# Patient Record
Sex: FEMALE | Race: BLACK OR AFRICAN AMERICAN | Employment: UNEMPLOYED | URBAN - METROPOLITAN AREA
[De-identification: names, ages, dates, MRNs, and addresses within clinical notes are randomized per-mention and may not be internally consistent; named-entity substitution may affect disease eponyms.]

---

## 2024-07-09 ENCOUNTER — PREP FOR PROCEDURE (OUTPATIENT)
Dept: OBGYN | Age: 34
End: 2024-07-09

## 2024-07-09 ENCOUNTER — HOSPITAL ENCOUNTER (OUTPATIENT)
Age: 34
Setting detail: SPECIMEN
Discharge: HOME | End: 2024-07-09

## 2024-07-09 ENCOUNTER — INITIAL PRENATAL (OUTPATIENT)
Dept: OBGYN | Age: 34
End: 2024-07-09

## 2024-07-09 VITALS — DIASTOLIC BLOOD PRESSURE: 64 MMHG | WEIGHT: 159 LBS | SYSTOLIC BLOOD PRESSURE: 101 MMHG

## 2024-07-09 DIAGNOSIS — Z98.891 S/P REPEAT LOW TRANSVERSE C-SECTION: ICD-10-CM

## 2024-07-09 DIAGNOSIS — Z3A.37 37 WEEKS GESTATION OF PREGNANCY: Primary | ICD-10-CM

## 2024-07-09 DIAGNOSIS — Z60.3 LANGUAGE BARRIER: ICD-10-CM

## 2024-07-09 DIAGNOSIS — Z12.4 CERVICAL CANCER SCREENING: ICD-10-CM

## 2024-07-09 DIAGNOSIS — O99.019 ANEMIA DURING PREGNANCY: ICD-10-CM

## 2024-07-09 DIAGNOSIS — B37.9 YEAST INFECTION: ICD-10-CM

## 2024-07-09 DIAGNOSIS — Z11.3 SCREENING EXAMINATION FOR STD (SEXUALLY TRANSMITTED DISEASE): ICD-10-CM

## 2024-07-09 DIAGNOSIS — Z34.83 PRENATAL CARE, SUBSEQUENT PREGNANCY, THIRD TRIMESTER: ICD-10-CM

## 2024-07-09 DIAGNOSIS — Z75.8 LANGUAGE BARRIER: ICD-10-CM

## 2024-07-09 DIAGNOSIS — O09.33 LATE PRENATAL CARE AFFECTING PREGNANCY IN THIRD TRIMESTER: ICD-10-CM

## 2024-07-09 PROCEDURE — 99214 OFFICE O/P EST MOD 30 MIN: CPT | Performed by: STUDENT IN AN ORGANIZED HEALTH CARE EDUCATION/TRAINING PROGRAM

## 2024-07-09 PROCEDURE — 36415 COLL VENOUS BLD VENIPUNCTURE: CPT | Performed by: STUDENT IN AN ORGANIZED HEALTH CARE EDUCATION/TRAINING PROGRAM

## 2024-07-09 PROCEDURE — 88142 CYTOPATH C/V THIN LAYER: CPT

## 2024-07-09 PROCEDURE — 87624 HPV HI-RISK TYP POOLED RSLT: CPT

## 2024-07-09 PROCEDURE — 87801 DETECT AGNT MULT DNA AMPLI: CPT

## 2024-07-09 RX ORDER — FERROUS SULFATE 325(65) MG
325 TABLET, DELAYED RELEASE (ENTERIC COATED) ORAL
COMMUNITY

## 2024-07-09 RX ORDER — CLOTRIMAZOLE 1 %
CREAM WITH APPLICATOR VAGINAL
Qty: 1 EACH | Refills: 0 | Status: SHIPPED | OUTPATIENT
Start: 2024-07-09

## 2024-07-09 SDOH — SOCIAL STABILITY - SOCIAL INSECURITY: ACCULTURATION DIFFICULTY: Z60.3

## 2024-07-09 NOTE — PROGRESS NOTES
Department of Obstetrics and Gynecology  Initial Prenatal Office Visit  Name:  Sabrina Pepe   CSN: [id number]    : 1990   Age: 33 y.o.     Chief Complaint   Patient presents with    Initial Prenatal Visit     New Ob from [place] had ultrasound there. Prenatal labs, 1 hr gtt and gbs today. Taking iron for anemia. Unsure if she is taking pnv. Pt also c/o burning on tongue like an open sore. Also states when she brushes teeth her tongue bleeds. +fm        Subjective:    Sabrina Pepe is a 33 y.o. female,  ,  LMP was Patient's last menstrual period was 10/23/2023., who presents for presents for prenatal care. She is certain about the date of her LMP. Her last pap smear was unknown. Reports her tongue bleeds when she brushes it.     Past History:  G1: PLTCS, male infant, for [facility] in [place]  G2: Current    Risk factors:  Late prenatal care  Hx Csx1([facility] in [place])  Anemia(iron)    She will be 33 years old or older at the time of her delivery.    Since her last menstrual period she denies alcohol use, tobacco use, marijuana use and illicit drug use.    The patient does not work. She denies X ray or radiation exposure. She denies exposure to industrial solvents. Her work does not take place in an environment of excessively high temperatures and does not expose her to loud ambient noise. She does not care for infants, toddlers or incontinent adults.     Patient denies personal history and family history of birth defects, genetic defects, bleeding disorders, mental retardation or neural tube defects.     Patient reports there are not issues with domestic violence and she feels safe in her home.    Past Medical History:   Diagnosis Date    Anemia        Past Surgical History:   Procedure Laterality Date     SECTION         Social History     Socioeconomic History    Marital status: Single     Spouse name: Not on file    Number of children: Not on file    Years of education: Not on file

## 2024-07-10 LAB
ABO + RH BLD: NORMAL
BASOPHILS # BLD: 0 K/UL (ref 0–0.2)
BASOPHILS NFR BLD: 0.8 %
BLD GP AB SCN SERPL QL: NORMAL
DEPRECATED RDW RBC AUTO: 14.5 % (ref 12.4–15.4)
EOSINOPHIL # BLD: 0.1 K/UL (ref 0–0.6)
EOSINOPHIL NFR BLD: 0.9 %
GLUCOSE 1H P 50 G GLC PO SERPL-MCNC: 72 MG/DL
HBV SURFACE AG SERPL QL IA: ABNORMAL
HCT VFR BLD AUTO: 33.1 % (ref 36–48)
HGB BLD-MCNC: 10.6 G/DL (ref 12–16)
HIV 1+2 AB+HIV1 P24 AG SERPL QL IA: NORMAL
HIV 2 AB SERPL QL IA: NORMAL
HIV1 AB SERPL QL IA: NORMAL
HIV1 P24 AG SERPL QL IA: NORMAL
LYMPHOCYTES # BLD: 1.5 K/UL (ref 1–5.1)
LYMPHOCYTES NFR BLD: 23.8 %
MCH RBC QN AUTO: 27.9 PG (ref 26–34)
MCHC RBC AUTO-ENTMCNC: 32.1 G/DL (ref 31–36)
MCV RBC AUTO: 86.8 FL (ref 80–100)
MONOCYTES # BLD: 0.9 K/UL (ref 0–1.3)
MONOCYTES NFR BLD: 14.6 %
NEUTROPHILS # BLD: 3.7 K/UL (ref 1.7–7.7)
NEUTROPHILS NFR BLD: 59.9 %
PLATELET # BLD AUTO: 313 K/UL (ref 135–450)
PMV BLD AUTO: 7.2 FL (ref 5–10.5)
RBC # BLD AUTO: 3.82 M/UL (ref 4–5.2)
REAGIN+T PALLIDUM IGG+IGM SERPL-IMP: ABNORMAL
RUBV IGG SERPL IA-ACNC: >500 IU/ML
WBC # BLD AUTO: 6.2 K/UL (ref 4–11)

## 2024-07-11 LAB
HCV RNA SERPL NAA+PROBE-ACNC: NOT DETECTED IU/ML
HCV RNA SERPL NAA+PROBE-LOG IU: NOT DETECTED LOG IU/ML
HCV RNA SERPL QL NAA+PROBE: NOT DETECTED

## 2024-07-12 LAB
C TRACH RRNA SPEC QL NAA+PROBE: NEGATIVE
GP B STREP SPEC QL CULT: ABNORMAL
N GONORRHOEA RRNA SPEC QL NAA+PROBE: NEGATIVE
ORGANISM: ABNORMAL

## 2024-07-13 LAB — HPV HIGH RISK: NOT DETECTED

## 2024-07-15 ENCOUNTER — ROUTINE PRENATAL (OUTPATIENT)
Dept: OBGYN | Age: 34
End: 2024-07-15

## 2024-07-15 VITALS — DIASTOLIC BLOOD PRESSURE: 62 MMHG | SYSTOLIC BLOOD PRESSURE: 99 MMHG | WEIGHT: 158 LBS

## 2024-07-15 DIAGNOSIS — Z3A.38 38 WEEKS GESTATION OF PREGNANCY: Primary | ICD-10-CM

## 2024-07-15 DIAGNOSIS — Z34.83 PRENATAL CARE, SUBSEQUENT PREGNANCY, THIRD TRIMESTER: ICD-10-CM

## 2024-07-15 NOTE — PROGRESS NOTES
Return OB Office Visit    CC:   Chief Complaint   Patient presents with    Routine Prenatal Visit     Repeat caesarean, preop today, consent signed. No c/o. +fm.        HPI:  Patient seen and examined. No concerns/complaints. Denies VB, LOF, ctx. +Fm.  Denies headaches, vision changes, RUQ pain, increased LE edema.  Denies chest pain, shortness of breath, fever, chills, nausea, vomiting.      Review of Systems: The following ROS was otherwise negative, except as noted in the HPI: constitutional, HEENT, respiratory, cardiovascular, gastrointestinal, genitourinary, skin, musculoskeletal, neurological, psych    Objective:  BP 99/62   Wt 71.7 kg (158 lb)   LMP 10/23/2023     Physical Exam    Gravid, non tender, no flank pain    FHR: + by doppler    Assessment/Plan:   Sabrina Pepe is a 33 y.o.  at 38w0d who presents for routine OB visit     Diagnosis Orders   1. 38 weeks gestation of pregnancy        2. Prenatal care, subsequent pregnancy, third trimester            Data Unavailable     Chaperone Nereida Buck was present for the entire appointment.      The patient will monitor for loss of fluid or vaginal bleeding.  If age-appropriate she will monitor for fetal movement.  If she is having more than 4-6 contractions an hour she will present to labor and delivery.  She will continue taking her prenatal vitamins as prescribed.  All up-to-date prenatal labs and imaging were reviewed and discussed with patient.    No follow-ups on file.    Luis Santa MD

## 2024-07-22 ENCOUNTER — ANESTHESIA (OUTPATIENT)
Dept: LABOR AND DELIVERY | Age: 34
End: 2024-07-22
Payer: MEDICAID

## 2024-07-22 ENCOUNTER — ANESTHESIA EVENT (OUTPATIENT)
Dept: LABOR AND DELIVERY | Age: 34
End: 2024-07-22
Payer: MEDICAID

## 2024-07-22 ENCOUNTER — HOSPITAL ENCOUNTER (INPATIENT)
Age: 34
LOS: 3 days | Discharge: HOME | End: 2024-07-25
Attending: STUDENT IN AN ORGANIZED HEALTH CARE EDUCATION/TRAINING PROGRAM | Admitting: STUDENT IN AN ORGANIZED HEALTH CARE EDUCATION/TRAINING PROGRAM
Payer: MEDICAID

## 2024-07-22 DIAGNOSIS — O34.219 PREVIOUS CESAREAN SECTION COMPLICATING PREGNANCY: Primary | ICD-10-CM

## 2024-07-22 PROBLEM — O99.013 ANEMIA AFFECTING PREGNANCY IN THIRD TRIMESTER: Status: ACTIVE | Noted: 2024-07-22

## 2024-07-22 PROBLEM — O09.33 LATE PRENATAL CARE AFFECTING PREGNANCY IN THIRD TRIMESTER: Status: ACTIVE | Noted: 2024-07-22

## 2024-07-22 PROBLEM — O99.820 GROUP B STREPTOCOCCAL CARRIAGE COMPLICATING PREGNANCY: Status: ACTIVE | Noted: 2024-07-22

## 2024-07-22 LAB
ABO/RH: NORMAL
AMPHETAMINES: NEGATIVE
ANTIBODY SCREEN: NEGATIVE
BACTERIA: NEGATIVE /HPF
BARBITURATE SCREEN URINE: NEGATIVE
BENZODIAZEPINE SCREEN, URINE: NEGATIVE
BILIRUBIN, URINE: ABNORMAL MG/DL
BLOOD, URINE: ABNORMAL
CANNABINOID SCREEN URINE: NEGATIVE
CLARITY, UA: CLEAR
COCAINE METABOLITE: NEGATIVE
COLOR, UA: YELLOW
FENTANYL URINE: NEGATIVE
GLUCOSE URINE: NEGATIVE MG/DL
HCT VFR BLD CALC: 32.7 % (ref 37–47)
HEMOGLOBIN: 10.6 GM/DL (ref 12.5–16)
KETONES, URINE: NEGATIVE MG/DL
LEUKOCYTE ESTERASE, URINE: ABNORMAL
MCH RBC QN AUTO: 27.7 PG (ref 27–31)
MCHC RBC AUTO-ENTMCNC: 32.4 % (ref 32–36)
MCV RBC AUTO: 85.4 FL (ref 78–100)
MUCUS: ABNORMAL HPF
NITRITE URINE, QUANTITATIVE: NEGATIVE
OPIATES, URINE: NEGATIVE
OXYCODONE: NEGATIVE
PDW BLD-RTO: 13.8 % (ref 11.7–14.9)
PH, URINE: 7 (ref 5–8)
PLATELET # BLD: 320 K/CU MM (ref 140–440)
PMV BLD AUTO: 8.8 FL (ref 7.5–11.1)
PROTEIN UA: 30 MG/DL
RBC # BLD: 3.83 M/CU MM (ref 4.2–5.4)
RBC URINE: 8 /HPF (ref 0–6)
SPECIFIC GRAVITY UA: 1.02 (ref 1–1.03)
SQUAMOUS EPITHELIAL: 28 /HPF
TRICHOMONAS: ABNORMAL /HPF
UROBILINOGEN, URINE: 1 MG/DL (ref 0.2–1)
WBC # BLD: 5.4 K/CU MM (ref 4–10.5)
WBC UA: 50 /HPF (ref 0–5)

## 2024-07-22 PROCEDURE — 7100000000 HC PACU RECOVERY - FIRST 15 MIN: Performed by: STUDENT IN AN ORGANIZED HEALTH CARE EDUCATION/TRAINING PROGRAM

## 2024-07-22 PROCEDURE — 6360000002 HC RX W HCPCS: Performed by: STUDENT IN AN ORGANIZED HEALTH CARE EDUCATION/TRAINING PROGRAM

## 2024-07-22 PROCEDURE — 6370000000 HC RX 637 (ALT 250 FOR IP): Performed by: STUDENT IN AN ORGANIZED HEALTH CARE EDUCATION/TRAINING PROGRAM

## 2024-07-22 PROCEDURE — 80307 DRUG TEST PRSMV CHEM ANLYZR: CPT

## 2024-07-22 PROCEDURE — 2580000003 HC RX 258: Performed by: STUDENT IN AN ORGANIZED HEALTH CARE EDUCATION/TRAINING PROGRAM

## 2024-07-22 PROCEDURE — 86780 TREPONEMA PALLIDUM: CPT

## 2024-07-22 PROCEDURE — 86850 RBC ANTIBODY SCREEN: CPT

## 2024-07-22 PROCEDURE — 86900 BLOOD TYPING SEROLOGIC ABO: CPT

## 2024-07-22 PROCEDURE — 1220000000 HC SEMI PRIVATE OB R&B

## 2024-07-22 PROCEDURE — 7100000001 HC PACU RECOVERY - ADDTL 15 MIN: Performed by: STUDENT IN AN ORGANIZED HEALTH CARE EDUCATION/TRAINING PROGRAM

## 2024-07-22 PROCEDURE — 3700000001 HC ADD 15 MINUTES (ANESTHESIA): Performed by: STUDENT IN AN ORGANIZED HEALTH CARE EDUCATION/TRAINING PROGRAM

## 2024-07-22 PROCEDURE — 3609079900 HC CESAREAN SECTION: Performed by: STUDENT IN AN ORGANIZED HEALTH CARE EDUCATION/TRAINING PROGRAM

## 2024-07-22 PROCEDURE — 6370000000 HC RX 637 (ALT 250 FOR IP)

## 2024-07-22 PROCEDURE — 6360000002 HC RX W HCPCS: Performed by: NURSE ANESTHETIST, CERTIFIED REGISTERED

## 2024-07-22 PROCEDURE — 85027 COMPLETE CBC AUTOMATED: CPT

## 2024-07-22 PROCEDURE — 3700000000 HC ANESTHESIA ATTENDED CARE: Performed by: STUDENT IN AN ORGANIZED HEALTH CARE EDUCATION/TRAINING PROGRAM

## 2024-07-22 PROCEDURE — 2709999900 HC NON-CHARGEABLE SUPPLY: Performed by: STUDENT IN AN ORGANIZED HEALTH CARE EDUCATION/TRAINING PROGRAM

## 2024-07-22 PROCEDURE — 2500000003 HC RX 250 WO HCPCS: Performed by: STUDENT IN AN ORGANIZED HEALTH CARE EDUCATION/TRAINING PROGRAM

## 2024-07-22 PROCEDURE — 86901 BLOOD TYPING SEROLOGIC RH(D): CPT

## 2024-07-22 PROCEDURE — 81001 URINALYSIS AUTO W/SCOPE: CPT

## 2024-07-22 PROCEDURE — 59514 CESAREAN DELIVERY ONLY: CPT | Performed by: STUDENT IN AN ORGANIZED HEALTH CARE EDUCATION/TRAINING PROGRAM

## 2024-07-22 RX ORDER — ONDANSETRON 2 MG/ML
4 INJECTION INTRAMUSCULAR; INTRAVENOUS EVERY 6 HOURS PRN
Status: DISCONTINUED | OUTPATIENT
Start: 2024-07-22 | End: 2024-07-25 | Stop reason: HOSPADM

## 2024-07-22 RX ORDER — MORPHINE SULFATE 0.5 MG/ML
INJECTION, SOLUTION EPIDURAL; INTRATHECAL; INTRAVENOUS PRN
Status: DISCONTINUED | OUTPATIENT
Start: 2024-07-22 | End: 2024-07-22 | Stop reason: SDUPTHER

## 2024-07-22 RX ORDER — FAMOTIDINE 20 MG/1
20 TABLET, FILM COATED ORAL 2 TIMES DAILY
Status: DISCONTINUED | OUTPATIENT
Start: 2024-07-22 | End: 2024-07-25 | Stop reason: HOSPADM

## 2024-07-22 RX ORDER — KETOROLAC TROMETHAMINE 30 MG/ML
30 INJECTION, SOLUTION INTRAMUSCULAR; INTRAVENOUS EVERY 6 HOURS
Status: DISCONTINUED | OUTPATIENT
Start: 2024-07-22 | End: 2024-07-23

## 2024-07-22 RX ORDER — SODIUM CHLORIDE, SODIUM LACTATE, POTASSIUM CHLORIDE, AND CALCIUM CHLORIDE .6; .31; .03; .02 G/100ML; G/100ML; G/100ML; G/100ML
1000 INJECTION, SOLUTION INTRAVENOUS ONCE
Status: COMPLETED | OUTPATIENT
Start: 2024-07-22 | End: 2024-07-22

## 2024-07-22 RX ORDER — ONDANSETRON 4 MG/1
4 TABLET, ORALLY DISINTEGRATING ORAL EVERY 8 HOURS PRN
Status: DISCONTINUED | OUTPATIENT
Start: 2024-07-22 | End: 2024-07-25 | Stop reason: HOSPADM

## 2024-07-22 RX ORDER — CITRIC ACID/SODIUM CITRATE 334-500MG
30 SOLUTION, ORAL ORAL ONCE
Status: COMPLETED | OUTPATIENT
Start: 2024-07-22 | End: 2024-07-22

## 2024-07-22 RX ORDER — BUPIVACAINE HYDROCHLORIDE 7.5 MG/ML
INJECTION, SOLUTION INTRASPINAL PRN
Status: DISCONTINUED | OUTPATIENT
Start: 2024-07-22 | End: 2024-07-22 | Stop reason: SDUPTHER

## 2024-07-22 RX ORDER — DEXAMETHASONE SODIUM PHOSPHATE 4 MG/ML
INJECTION, SOLUTION INTRA-ARTICULAR; INTRALESIONAL; INTRAMUSCULAR; INTRAVENOUS; SOFT TISSUE PRN
Status: DISCONTINUED | OUTPATIENT
Start: 2024-07-22 | End: 2024-07-22 | Stop reason: SDUPTHER

## 2024-07-22 RX ORDER — ONDANSETRON 2 MG/ML
INJECTION INTRAMUSCULAR; INTRAVENOUS PRN
Status: DISCONTINUED | OUTPATIENT
Start: 2024-07-22 | End: 2024-07-22 | Stop reason: SDUPTHER

## 2024-07-22 RX ORDER — SODIUM CHLORIDE 0.9 % (FLUSH) 0.9 %
5-40 SYRINGE (ML) INJECTION PRN
Status: DISCONTINUED | OUTPATIENT
Start: 2024-07-22 | End: 2024-07-22 | Stop reason: HOSPADM

## 2024-07-22 RX ORDER — DIPHENHYDRAMINE HCL 25 MG
25 TABLET ORAL EVERY 6 HOURS PRN
Status: DISCONTINUED | OUTPATIENT
Start: 2024-07-22 | End: 2024-07-25 | Stop reason: HOSPADM

## 2024-07-22 RX ORDER — DOCUSATE SODIUM 100 MG/1
100 CAPSULE, LIQUID FILLED ORAL 2 TIMES DAILY
Status: DISCONTINUED | OUTPATIENT
Start: 2024-07-22 | End: 2024-07-25 | Stop reason: HOSPADM

## 2024-07-22 RX ORDER — SODIUM CHLORIDE 0.9 % (FLUSH) 0.9 %
5-40 SYRINGE (ML) INJECTION EVERY 12 HOURS SCHEDULED
Status: DISCONTINUED | OUTPATIENT
Start: 2024-07-22 | End: 2024-07-22 | Stop reason: HOSPADM

## 2024-07-22 RX ORDER — SODIUM CHLORIDE 9 MG/ML
INJECTION, SOLUTION INTRAVENOUS PRN
Status: DISCONTINUED | OUTPATIENT
Start: 2024-07-22 | End: 2024-07-22 | Stop reason: HOSPADM

## 2024-07-22 RX ORDER — FENTANYL CITRATE 50 UG/ML
INJECTION, SOLUTION INTRAMUSCULAR; INTRAVENOUS PRN
Status: DISCONTINUED | OUTPATIENT
Start: 2024-07-22 | End: 2024-07-22 | Stop reason: SDUPTHER

## 2024-07-22 RX ORDER — SODIUM CHLORIDE 0.9 % (FLUSH) 0.9 %
10 SYRINGE (ML) INJECTION PRN
Status: DISCONTINUED | OUTPATIENT
Start: 2024-07-22 | End: 2024-07-22

## 2024-07-22 RX ORDER — ONDANSETRON 2 MG/ML
4 INJECTION INTRAMUSCULAR; INTRAVENOUS
Status: DISCONTINUED | OUTPATIENT
Start: 2024-07-22 | End: 2024-07-22 | Stop reason: HOSPADM

## 2024-07-22 RX ORDER — ACETAMINOPHEN 325 MG/1
975 TABLET ORAL ONCE
Status: COMPLETED | OUTPATIENT
Start: 2024-07-22 | End: 2024-07-22

## 2024-07-22 RX ORDER — SODIUM CHLORIDE 0.9 % (FLUSH) 0.9 %
5-40 SYRINGE (ML) INJECTION PRN
Status: DISCONTINUED | OUTPATIENT
Start: 2024-07-22 | End: 2024-07-25 | Stop reason: HOSPADM

## 2024-07-22 RX ORDER — ONDANSETRON 2 MG/ML
4 INJECTION INTRAMUSCULAR; INTRAVENOUS EVERY 6 HOURS PRN
Status: DISCONTINUED | OUTPATIENT
Start: 2024-07-22 | End: 2024-07-22

## 2024-07-22 RX ORDER — SODIUM CHLORIDE 0.9 % (FLUSH) 0.9 %
10 SYRINGE (ML) INJECTION EVERY 12 HOURS SCHEDULED
Status: DISCONTINUED | OUTPATIENT
Start: 2024-07-22 | End: 2024-07-22

## 2024-07-22 RX ORDER — NALOXONE HYDROCHLORIDE 0.4 MG/ML
INJECTION, SOLUTION INTRAMUSCULAR; INTRAVENOUS; SUBCUTANEOUS PRN
Status: DISCONTINUED | OUTPATIENT
Start: 2024-07-22 | End: 2024-07-22 | Stop reason: HOSPADM

## 2024-07-22 RX ORDER — ENOXAPARIN SODIUM 100 MG/ML
40 INJECTION SUBCUTANEOUS DAILY
Status: DISCONTINUED | OUTPATIENT
Start: 2024-07-23 | End: 2024-07-25 | Stop reason: HOSPADM

## 2024-07-22 RX ORDER — IBUPROFEN 800 MG/1
800 TABLET ORAL EVERY 8 HOURS
Status: DISCONTINUED | OUTPATIENT
Start: 2024-07-23 | End: 2024-07-23

## 2024-07-22 RX ORDER — SODIUM CHLORIDE 9 MG/ML
INJECTION, SOLUTION INTRAVENOUS PRN
Status: DISCONTINUED | OUTPATIENT
Start: 2024-07-22 | End: 2024-07-22

## 2024-07-22 RX ORDER — OXYCODONE HYDROCHLORIDE 5 MG/1
5 TABLET ORAL EVERY 4 HOURS PRN
Status: DISCONTINUED | OUTPATIENT
Start: 2024-07-22 | End: 2024-07-25 | Stop reason: HOSPADM

## 2024-07-22 RX ORDER — OXYCODONE HYDROCHLORIDE 5 MG/1
10 TABLET ORAL EVERY 4 HOURS PRN
Status: DISCONTINUED | OUTPATIENT
Start: 2024-07-22 | End: 2024-07-25 | Stop reason: HOSPADM

## 2024-07-22 RX ORDER — DIPHENHYDRAMINE HYDROCHLORIDE 50 MG/ML
25 INJECTION INTRAMUSCULAR; INTRAVENOUS EVERY 6 HOURS PRN
Status: DISCONTINUED | OUTPATIENT
Start: 2024-07-22 | End: 2024-07-25 | Stop reason: HOSPADM

## 2024-07-22 RX ORDER — SODIUM CHLORIDE 9 MG/ML
INJECTION, SOLUTION INTRAVENOUS PRN
Status: DISCONTINUED | OUTPATIENT
Start: 2024-07-22 | End: 2024-07-25 | Stop reason: HOSPADM

## 2024-07-22 RX ORDER — SODIUM CHLORIDE 0.9 % (FLUSH) 0.9 %
5-40 SYRINGE (ML) INJECTION EVERY 12 HOURS SCHEDULED
Status: DISCONTINUED | OUTPATIENT
Start: 2024-07-22 | End: 2024-07-25 | Stop reason: HOSPADM

## 2024-07-22 RX ORDER — SODIUM CHLORIDE, SODIUM LACTATE, POTASSIUM CHLORIDE, CALCIUM CHLORIDE 600; 310; 30; 20 MG/100ML; MG/100ML; MG/100ML; MG/100ML
INJECTION, SOLUTION INTRAVENOUS CONTINUOUS
Status: DISCONTINUED | OUTPATIENT
Start: 2024-07-22 | End: 2024-07-22

## 2024-07-22 RX ORDER — LANOLIN 72 %
OINTMENT (GRAM) TOPICAL
Status: DISCONTINUED | OUTPATIENT
Start: 2024-07-22 | End: 2024-07-25 | Stop reason: HOSPADM

## 2024-07-22 RX ORDER — FAMOTIDINE 10 MG/ML
20 INJECTION, SOLUTION INTRAVENOUS ONCE
Status: COMPLETED | OUTPATIENT
Start: 2024-07-22 | End: 2024-07-22

## 2024-07-22 RX ORDER — ACETAMINOPHEN 500 MG
1000 TABLET ORAL EVERY 8 HOURS SCHEDULED
Status: DISCONTINUED | OUTPATIENT
Start: 2024-07-22 | End: 2024-07-25 | Stop reason: HOSPADM

## 2024-07-22 RX ADMIN — DEXAMETHASONE SODIUM PHOSPHATE 4 MG: 4 INJECTION, SOLUTION INTRAMUSCULAR; INTRAVENOUS at 15:21

## 2024-07-22 RX ADMIN — BUPIVACAINE HYDROCHLORIDE IN DEXTROSE 1.6 ML: 7.5 INJECTION, SOLUTION SUBARACHNOID at 14:25

## 2024-07-22 RX ADMIN — FENTANYL CITRATE 10 MCG: 50 INJECTION, SOLUTION INTRAMUSCULAR; INTRAVENOUS at 14:25

## 2024-07-22 RX ADMIN — ONDANSETRON 4 MG: 2 INJECTION INTRAMUSCULAR; INTRAVENOUS at 15:21

## 2024-07-22 RX ADMIN — ONDANSETRON 4 MG: 2 INJECTION INTRAMUSCULAR; INTRAVENOUS at 13:23

## 2024-07-22 RX ADMIN — WATER 2000 MG: 1 INJECTION INTRAMUSCULAR; INTRAVENOUS; SUBCUTANEOUS at 14:13

## 2024-07-22 RX ADMIN — SODIUM CITRATE AND CITRIC ACID MONOHYDRATE 30 ML: 500; 334 SOLUTION ORAL at 13:23

## 2024-07-22 RX ADMIN — ONDANSETRON 4 MG: 2 INJECTION INTRAMUSCULAR; INTRAVENOUS at 20:59

## 2024-07-22 RX ADMIN — SODIUM CHLORIDE, POTASSIUM CHLORIDE, SODIUM LACTATE AND CALCIUM CHLORIDE: 600; 310; 30; 20 INJECTION, SOLUTION INTRAVENOUS at 13:34

## 2024-07-22 RX ADMIN — ACETAMINOPHEN 975 MG: 325 TABLET ORAL at 13:24

## 2024-07-22 RX ADMIN — CEFAZOLIN 2000 MG: 2 INJECTION, POWDER, FOR SOLUTION INTRAMUSCULAR; INTRAVENOUS at 13:34

## 2024-07-22 RX ADMIN — Medication 87.3 MILLI-UNITS/MIN: at 15:16

## 2024-07-22 RX ADMIN — MORPHINE SULFATE 0.2 MG: 0.5 INJECTION, SOLUTION EPIDURAL; INTRATHECAL; INTRAVENOUS at 14:25

## 2024-07-22 RX ADMIN — KETOROLAC TROMETHAMINE 30 MG: 30 INJECTION, SOLUTION INTRAMUSCULAR; INTRAVENOUS at 20:59

## 2024-07-22 RX ADMIN — SODIUM CHLORIDE, POTASSIUM CHLORIDE, SODIUM LACTATE AND CALCIUM CHLORIDE 1000 ML: 600; 310; 30; 20 INJECTION, SOLUTION INTRAVENOUS at 12:45

## 2024-07-22 RX ADMIN — FAMOTIDINE 20 MG: 10 INJECTION, SOLUTION INTRAVENOUS at 13:23

## 2024-07-22 RX ADMIN — SODIUM CHLORIDE, POTASSIUM CHLORIDE, SODIUM LACTATE AND CALCIUM CHLORIDE: 600; 310; 30; 20 INJECTION, SOLUTION INTRAVENOUS at 15:07

## 2024-07-22 RX ADMIN — SODIUM CHLORIDE, PRESERVATIVE FREE 10 ML: 5 INJECTION INTRAVENOUS at 20:59

## 2024-07-22 ASSESSMENT — PAIN SCALES - GENERAL: PAINLEVEL_OUTOF10: 1

## 2024-07-22 ASSESSMENT — PAIN - FUNCTIONAL ASSESSMENT: PAIN_FUNCTIONAL_ASSESSMENT: ACTIVITIES ARE NOT PREVENTED

## 2024-07-22 ASSESSMENT — PAIN DESCRIPTION - LOCATION: LOCATION: ABDOMEN;INCISION

## 2024-07-22 ASSESSMENT — PAIN DESCRIPTION - ORIENTATION: ORIENTATION: LOWER

## 2024-07-22 ASSESSMENT — PAIN DESCRIPTION - DESCRIPTORS: DESCRIPTORS: ACHING;CRAMPING;DISCOMFORT

## 2024-07-22 NOTE — ANESTHESIA PRE PROCEDURE
Department of Anesthesiology  Preprocedure Note       Name:  Sabrina Pepe   Age:  33 y.o.  :  1990                                          MRN:  [id number]         Date:  2024      Surgeon: Surgeon(s):  Anna Higgins DO    Procedure: Procedure(s):   SECTION    Medications prior to admission:   Prior to Admission medications    Medication Sig Start Date End Date Taking? Authorizing Provider   ferrous sulfate (FE TABS 325) 325 (65 Fe) MG EC tablet Take 1 tablet by mouth 3 times daily (with meals)    Provider, MD Shahram   clotrimazole (LOTRIMIN) 1 % vaginal cream Place 1 applicator nightly at bedtime for 7 days. 24   Anna Higgins DO       Current medications:    Current Facility-Administered Medications   Medication Dose Route Frequency Provider Last Rate Last Admin   • lactated ringers IV soln infusion   IntraVENous Continuous Anna Higgins DO       • lactated ringers bolus 1,000 mL  1,000 mL IntraVENous Once Anna Higgins DO       • sodium chloride flush 0.9 % injection 10 mL  10 mL IntraVENous 2 times per day Anna Higgins DO       • sodium chloride flush 0.9 % injection 10 mL  10 mL IntraVENous PRN Anna Higgins DO       • 0.9 % sodium chloride infusion   IntraVENous PRN Anna Higgins DO       • citric acid-sodium citrate (BICITRA) solution 30 mL  30 mL Oral Once Anna Higgins DO       • famotidine (PEPCID) injection 20 mg  20 mg IntraVENous Once Anna Higgins DO       • acetaminophen (TYLENOL) tablet 975 mg  975 mg Oral Once Anna Higgins DO       • oxytocin (PITOCIN) 30 units in 500 mL infusion  87.3 clif-units/min IntraVENous Continuous PRN Anna Higgins DO        And   • oxytocin (PITOCIN) 10 unit bolus from the bag  10 Units IntraVENous PRN Anna Higgins DO       • ondansetron (ZOFRAN) injection 4 mg  4 mg IntraVENous Q6H PRN Anna Higgins DO       • ceFAZolin (ANCEF) 2,000 mg in sterile water 20 mL IV syringe  2,000 mg IntraVENous Once Anna Higgins DO

## 2024-07-22 NOTE — ANESTHESIA POSTPROCEDURE EVALUATION
Department of Anesthesiology  Postprocedure Note    Patient: Sabrina Pepe  MRN: [id number]  YOB: 1990  Date of evaluation: 2024    Procedure Summary       Date: 24 Room / Location: [address]    Anesthesia Start: 1420 Anesthesia Stop:     Procedure:  SECTION (Abdomen) Diagnosis:       S/P repeat low transverse       (S/P repeat low transverse  [Z98.891])    Surgeons: Anna Higgins DO Responsible Provider: Elder Leonard MD    Anesthesia Type: spinal ASA Status: 2            Anesthesia Type: No value filed.    Ubaldo Phase I:      Ubaldo Phase II:      Anesthesia Post Evaluation    Patient location during evaluation: PACU  Patient participation: complete - patient participated  Level of consciousness: awake  Pain score: 0  Airway patency: patent  Nausea & Vomiting: no nausea and no vomiting  Cardiovascular status: blood pressure returned to baseline  Respiratory status: acceptable  Hydration status: euvolemic  Multimodal analgesia pain management approach  Pain management: adequate    There were no known notable events for this encounter.

## 2024-07-22 NOTE — CONSULTS
Session ID: [id number]  Language: Omani Creole   ID: #[id number]   Name: Radha    Recovery start time 1603. Informed patient of  Plan of care. Baby remains in SCN for assessment. Patient voiced understanding. Patient denies pain or needs.

## 2024-07-22 NOTE — ANESTHESIA PROCEDURE NOTES
Spinal Block    End time: 7/22/2024 2:25 PM  Reason for block: primary anesthetic  Staffing  Performed: resident/CRNA   Resident/CRNA: Raymond Wilkerson APRN - CRNA  Performed by: Raymond Wilkerson APRN - CRNA  Authorized by: Elder Leonard MD    Spinal Block  Patient position: sitting  Prep: ChloraPrep  Patient monitoring: cardiac monitor, continuous pulse ox and continuous capnometry  Approach: midline  Location: L3/L4  Provider prep: mask and sterile gloves  Local infiltration: lidocaine  Needle  Needle type: Pencan   Needle gauge: 24 G  Needle length: 4 in  Assessment  Sensory level: T4  Swirl obtained: Yes  CSF: clear  Attempts: 1  Hemodynamics: stable  Preanesthetic Checklist  Completed: patient identified, IV checked, site marked, risks and benefits discussed, surgical/procedural consents, equipment checked, pre-op evaluation, timeout performed, anesthesia consent given, oxygen available, monitors applied/VS acknowledged, fire risk safety assessment completed and verbalized and blood product R/B/A discussed and consented

## 2024-07-22 NOTE — OP NOTE
Department of Obstetrics and Gynecology  Labor and Delivery  Operative Report  Name:  Sabrina Pepe   CSN: [id number]   Attending Provider: Anna Higgins DO  Location:  [facility]   : 1990   Age: 33 y.o.     Section Operative Report    Date of surgery:   2024      Surgeon:   Anna Higgins DO    First Assistant:   Marianne Syed RN    Pre-op Diagnosis:   1. 39w0d IUP  2. Previous CS x1  3. GBS positive  4. Anemia in pregnancy  5. Late prenatal care    Post-op Diagnosis:   1. 39w0d IUP  2. Previous CS x1  3. GBS positive  4. Anemia in pregnancy  5. Late prenatal care  6. Bladder peritoneal adhesions  7. Live female infant 7lb 2oz APGARs pending      Procedure:   RLTCS    Complications:   None    Anesthesia:   Spinal    QBL:  755 cc    Specimen:  Cord blood    Indication:   The patient is a 33 y.o.  @ 39w0d presented for scheduled RLTCS.    Findings: Uterus, tubes and ovaries consistent with normal gravid state. Tubes and ovaries freely movable. Bladder peritoneal adhesion present to anterior surface of uterus. Fascia also thickened with scar tissue.      Procedure in Detail: After discussing the risks and benefits with the patient including but not limited to damage to bowel, bladder, blood vessels, and the ureters, as well as DVT/PE, the patient was consented and taken to the operating room.  Pre-operative antibiotics were given, spinal anesthesia was placed, and a crowe catheter was inserted which was draining clear urine.  The patient was then prepped and draped in the normal sterile manner in the supine position with a leftward tilt.  Anesthesia was then noted to be adequate. A pfannensteil skin incision was created sharply two finger breadths above the pubic symphysis and incision was carried down to the underlying layer of fascia with the bovie.  The fascia was incised in the midline and the incision was extended laterally with the Yoo scissors.  The superior aspect of the

## 2024-07-22 NOTE — PROGRESS NOTES
Patient arrived ambulatory to Labor & Delivery for scheduled . Patient shown to room and instructed to place on gown and obtain urine specimen. Patient oriented to room and call light.    EFM & toco applied, +FHR. Abdomen soft and non tender to palpation. Patient denies headache, epigastric pain, abdominal pain and contractions. Patient reports feeling her baby move well. Denies vaginal bleeding or leaking of fluid.     Discussed safe sleep and infant/patient safety and fall prevention information. Patient voiced understanding and forms completed. Fingerprints obtained.     Plan of care for  discussed with patient in length. Discussed patient's birth plan for her labor, as well as pharmacological and non pharmacological interventions for pain relief after surgery.  Patient voiced understanding.

## 2024-07-22 NOTE — PROGRESS NOTES
1517 Blayne called to delivery for assistance.     1518 Dr. Cee arrived to bedside to assist with  resuscitation.

## 2024-07-22 NOTE — H&P
MANY (A) NEGATIVE HPF    Trichomonas NONE SEEN NONE SEEN /HPF       ASSESSMENT AND PLAN:     The patient is a 33 y.o.  at 39w0d weeks with scheduled RLTCS  Present on Admission:   Previous  section complicating pregnancy   Late prenatal care affecting pregnancy in third trimester   Group B streptococcal carriage complicating pregnancy   Anemia affecting pregnancy in third trimester   Labor and delivery indication for care or intervention    Admission labs  Ancef 2g for surgical prophylaxis     Patient was counseled on the risks, benefits, alternatives and indications of  delivery. Risks include, but are not limited to infection, bleeding possibly requiring transfusion (with associated risks of HIV, hepatitis, and other blood-borne illnesses) or hysterectomy, injury to surrounding organs, vasculature, and nerves, injury to fetus, post-operative thromboembolic events (including DVT and PE), risk of anesthesia, and risk of death.  Risk for future pregnancy and delivery concerns including risk of trial of labor after  section and repeat  section reviewed.  Patient verbalized understanding of these risks. All questions were answered to her satisfaction. Pt desires to proceed with  delivery.    Electronically signed: Anna Higgins DO 2024

## 2024-07-23 LAB
HCT VFR BLD CALC: 30.7 % (ref 37–47)
HEMOGLOBIN: 9.7 GM/DL (ref 12.5–16)
MCH RBC QN AUTO: 27.6 PG (ref 27–31)
MCHC RBC AUTO-ENTMCNC: 31.6 % (ref 32–36)
MCV RBC AUTO: 87.2 FL (ref 78–100)
PDW BLD-RTO: 13.9 % (ref 11.7–14.9)
PLATELET # BLD: 279 K/CU MM (ref 140–440)
PMV BLD AUTO: 8.9 FL (ref 7.5–11.1)
RBC # BLD: 3.52 M/CU MM (ref 4.2–5.4)
WBC # BLD: 12.6 K/CU MM (ref 4–10.5)

## 2024-07-23 PROCEDURE — 1220000000 HC SEMI PRIVATE OB R&B

## 2024-07-23 PROCEDURE — 36415 COLL VENOUS BLD VENIPUNCTURE: CPT

## 2024-07-23 PROCEDURE — 6370000000 HC RX 637 (ALT 250 FOR IP): Performed by: STUDENT IN AN ORGANIZED HEALTH CARE EDUCATION/TRAINING PROGRAM

## 2024-07-23 PROCEDURE — 99211 OFF/OP EST MAY X REQ PHY/QHP: CPT

## 2024-07-23 PROCEDURE — 2580000003 HC RX 258: Performed by: STUDENT IN AN ORGANIZED HEALTH CARE EDUCATION/TRAINING PROGRAM

## 2024-07-23 PROCEDURE — 85027 COMPLETE CBC AUTOMATED: CPT

## 2024-07-23 PROCEDURE — 6360000002 HC RX W HCPCS: Performed by: STUDENT IN AN ORGANIZED HEALTH CARE EDUCATION/TRAINING PROGRAM

## 2024-07-23 RX ORDER — IBUPROFEN 800 MG/1
800 TABLET ORAL EVERY 8 HOURS
Status: DISCONTINUED | OUTPATIENT
Start: 2024-07-23 | End: 2024-07-25 | Stop reason: HOSPADM

## 2024-07-23 RX ADMIN — IBUPROFEN 800 MG: 800 TABLET, FILM COATED ORAL at 19:11

## 2024-07-23 RX ADMIN — DOCUSATE SODIUM 100 MG: 100 CAPSULE, LIQUID FILLED ORAL at 08:38

## 2024-07-23 RX ADMIN — IBUPROFEN 800 MG: 800 TABLET, FILM COATED ORAL at 05:30

## 2024-07-23 RX ADMIN — ENOXAPARIN SODIUM 40 MG: 100 INJECTION SUBCUTANEOUS at 08:38

## 2024-07-23 RX ADMIN — FAMOTIDINE 20 MG: 20 TABLET ORAL at 21:03

## 2024-07-23 RX ADMIN — ACETAMINOPHEN 1000 MG: 500 TABLET ORAL at 08:36

## 2024-07-23 RX ADMIN — FAMOTIDINE 20 MG: 20 TABLET ORAL at 08:38

## 2024-07-23 RX ADMIN — ACETAMINOPHEN 1000 MG: 500 TABLET ORAL at 22:44

## 2024-07-23 RX ADMIN — OXYCODONE HYDROCHLORIDE 10 MG: 5 TABLET ORAL at 15:18

## 2024-07-23 RX ADMIN — ACETAMINOPHEN 1000 MG: 500 TABLET ORAL at 15:17

## 2024-07-23 RX ADMIN — DOCUSATE SODIUM 100 MG: 100 CAPSULE, LIQUID FILLED ORAL at 21:03

## 2024-07-23 RX ADMIN — DIPHENHYDRAMINE HYDROCHLORIDE 25 MG: 25 TABLET ORAL at 21:03

## 2024-07-23 RX ADMIN — ACETAMINOPHEN 1000 MG: 500 TABLET ORAL at 00:02

## 2024-07-23 ASSESSMENT — PAIN DESCRIPTION - DESCRIPTORS
DESCRIPTORS: ACHING;CRAMPING;DISCOMFORT
DESCRIPTORS: ACHING;CRAMPING;DISCOMFORT
DESCRIPTORS: ACHING
DESCRIPTORS: ACHING
DESCRIPTORS: ACHING;CRAMPING;DISCOMFORT
DESCRIPTORS: CRAMPING;DISCOMFORT

## 2024-07-23 ASSESSMENT — PAIN - FUNCTIONAL ASSESSMENT
PAIN_FUNCTIONAL_ASSESSMENT: ACTIVITIES ARE NOT PREVENTED

## 2024-07-23 ASSESSMENT — PAIN DESCRIPTION - ONSET
ONSET: ON-GOING
ONSET: GRADUAL

## 2024-07-23 ASSESSMENT — PAIN SCALES - GENERAL
PAINLEVEL_OUTOF10: 5
PAINLEVEL_OUTOF10: 0
PAINLEVEL_OUTOF10: 5
PAINLEVEL_OUTOF10: 1
PAINLEVEL_OUTOF10: 0
PAINLEVEL_OUTOF10: 8
PAINLEVEL_OUTOF10: 0

## 2024-07-23 ASSESSMENT — PAIN DESCRIPTION - LOCATION
LOCATION: ABDOMEN;INCISION
LOCATION: ABDOMEN;INCISION
LOCATION: ABDOMEN
LOCATION: ABDOMEN

## 2024-07-23 ASSESSMENT — PAIN DESCRIPTION - ORIENTATION
ORIENTATION: LOWER

## 2024-07-23 ASSESSMENT — PAIN DESCRIPTION - FREQUENCY
FREQUENCY: CONTINUOUS
FREQUENCY: INTERMITTENT

## 2024-07-23 ASSESSMENT — PAIN DESCRIPTION - PAIN TYPE: TYPE: ACUTE PAIN

## 2024-07-23 NOTE — PLAN OF CARE
Problem: Pain  Goal: Verbalizes/displays adequate comfort level or baseline comfort level  Outcome: Progressing  Flowsheets (Taken 7/23/2024 0808)  Verbalizes/displays adequate comfort level or baseline comfort level:   Encourage patient to monitor pain and request assistance   Administer analgesics based on type and severity of pain and evaluate response   Consider cultural and social influences on pain and pain management   Assess pain using appropriate pain scale   Implement non-pharmacological measures as appropriate and evaluate response     Problem: Infection - Adult  Goal: Absence of infection at discharge  Outcome: Progressing  Goal: Absence of infection during hospitalization  Outcome: Progressing  Goal: Absence of fever/infection during anticipated neutropenic period  Outcome: Progressing     Problem: Safety - Adult  Goal: Free from fall injury  Outcome: Progressing     Problem: Discharge Planning  Goal: Discharge to home or other facility with appropriate resources  Outcome: Progressing

## 2024-07-23 NOTE — PLAN OF CARE
Problem: Pain  Goal: Verbalizes/displays adequate comfort level or baseline comfort level  7/23/2024 1324 by Fadumo Salas RN  Outcome: Progressing  7/23/2024 1159 by Fadumo Salas RN  Outcome: Progressing  Flowsheets (Taken 7/23/2024 0808)  Verbalizes/displays adequate comfort level or baseline comfort level:   Encourage patient to monitor pain and request assistance   Administer analgesics based on type and severity of pain and evaluate response   Consider cultural and social influences on pain and pain management   Assess pain using appropriate pain scale   Implement non-pharmacological measures as appropriate and evaluate response     Problem: Infection - Adult  Goal: Absence of infection at discharge  7/23/2024 1324 by Fadumo Salas RN  Outcome: Progressing  7/23/2024 1159 by Fadumo Salas RN  Outcome: Progressing  Goal: Absence of infection during hospitalization  7/23/2024 1324 by Fadumo Salas RN  Outcome: Progressing  7/23/2024 1159 by Fadumo Salas RN  Outcome: Progressing  Goal: Absence of fever/infection during anticipated neutropenic period  7/23/2024 1324 by Fadumo Salas RN  Outcome: Progressing  7/23/2024 1159 by Fadumo Salas RN  Outcome: Progressing     Problem: Safety - Adult  Goal: Free from fall injury  7/23/2024 1324 by Fadumo Salas RN  Outcome: Progressing  7/23/2024 1159 by Fadumo Salas RN  Outcome: Progressing     Problem: Discharge Planning  Goal: Discharge to home or other facility with appropriate resources  7/23/2024 1324 by Fadumo Salas RN  Outcome: Progressing  7/23/2024 1159 by Fadumo Salas, RN  Outcome: Progressing

## 2024-07-23 NOTE — PROGRESS NOTES
Subjective:     Postpartum Day 1:   The patient feels well. The patient denies emotional concerns. Pain is well controlled with current medications. The baby iswell.   The patient is ambulating well. The patient is tolerating a normal diet.    Objective:        Vitals:    07/23/24 0423   BP: (!) 92/52   Pulse: 68   Resp: 16   Temp: 97.7 °F (36.5 °C)   SpO2: 99%       Lab Results   Component Value Date    WBC 12.6 (H) 07/23/2024    HGB 9.7 (L) 07/23/2024    HCT 30.7 (L) 07/23/2024    MCV 87.2 07/23/2024     07/23/2024       General:    alert, appears stated age, and cooperative       Lochia:  appropriate   Uterine    firm       DVT Evaluation:  No evidence of DVT seen on physical exam.     Assessment:     Postpartum day 1 Doing well post delivery.     Plan:     Continue current care.    Luis Santa MD 7/23/2024 8:40 AM

## 2024-07-23 NOTE — LACTATION NOTE
Language line used.    ID: #[id number]   Name: Miroslava               Infant is in SCN. Mother states she would like to use electric breast pump. Hospital electric breast pump to mother and set up and showed her how to use pump. Encouraged mother to pump every 2 hours, even during the night, and to pump for 10-15 minutes. Mother verbalizes understanding. Discussed with mother that if she expresses any breast milk we can store it in the breast milk only refrigerator. Mother verbalizes understanding.

## 2024-07-23 NOTE — PLAN OF CARE
Problem: Pain  Goal: Verbalizes/displays adequate comfort level or baseline comfort level  7/22/2024 2141 by Dinae Lozada RN  Outcome: Progressing  Flowsheets (Taken 7/22/2024 2059)  Verbalizes/displays adequate comfort level or baseline comfort level:   Encourage patient to monitor pain and request assistance   Assess pain using appropriate pain scale   Administer analgesics based on type and severity of pain and evaluate response   Consider cultural and social influences on pain and pain management   Implement non-pharmacological measures as appropriate and evaluate response   Notify Licensed Independent Practitioner if interventions unsuccessful or patient reports new pain  7/22/2024 1355 by Yana Spears, RN  Outcome: Progressing     Problem: Infection - Adult  Goal: Absence of infection at discharge  7/22/2024 2141 by Diane Lozada RN  Outcome: Progressing  7/22/2024 1355 by Yana Spears RN  Outcome: Progressing  Goal: Absence of infection during hospitalization  7/22/2024 2141 by Diane Lozada RN  Outcome: Progressing  7/22/2024 1355 by Yana Spears RN  Outcome: Progressing  Goal: Absence of fever/infection during anticipated neutropenic period  7/22/2024 2141 by Diane Lozada RN  Outcome: Progressing  7/22/2024 1355 by Yana Spears RN  Outcome: Progressing     Problem: Safety - Adult  Goal: Free from fall injury  7/22/2024 2141 by Diane Lozada RN  Outcome: Progressing  7/22/2024 1355 by Yana Spears RN  Outcome: Progressing     Problem: Discharge Planning  Goal: Discharge to home or other facility with appropriate resources  7/22/2024 2141 by Diane Lozada RN  Outcome: Progressing  7/22/2024 1355 by Yana Spears RN  Outcome: Progressing

## 2024-07-24 LAB — T PALLIDUM IGG SER QL IF: NON REACTIVE

## 2024-07-24 PROCEDURE — 6360000002 HC RX W HCPCS: Performed by: STUDENT IN AN ORGANIZED HEALTH CARE EDUCATION/TRAINING PROGRAM

## 2024-07-24 PROCEDURE — 6370000000 HC RX 637 (ALT 250 FOR IP)

## 2024-07-24 PROCEDURE — 6370000000 HC RX 637 (ALT 250 FOR IP): Performed by: STUDENT IN AN ORGANIZED HEALTH CARE EDUCATION/TRAINING PROGRAM

## 2024-07-24 PROCEDURE — 99024 POSTOP FOLLOW-UP VISIT: CPT

## 2024-07-24 PROCEDURE — 1220000000 HC SEMI PRIVATE OB R&B

## 2024-07-24 RX ORDER — FERROUS SULFATE 325(65) MG
325 TABLET ORAL 2 TIMES DAILY WITH MEALS
Status: DISCONTINUED | OUTPATIENT
Start: 2024-07-24 | End: 2024-07-25 | Stop reason: HOSPADM

## 2024-07-24 RX ADMIN — DOCUSATE SODIUM 100 MG: 100 CAPSULE, LIQUID FILLED ORAL at 21:14

## 2024-07-24 RX ADMIN — FAMOTIDINE 20 MG: 20 TABLET ORAL at 21:14

## 2024-07-24 RX ADMIN — FAMOTIDINE 20 MG: 20 TABLET ORAL at 10:00

## 2024-07-24 RX ADMIN — DOCUSATE SODIUM 100 MG: 100 CAPSULE, LIQUID FILLED ORAL at 09:59

## 2024-07-24 RX ADMIN — FERROUS SULFATE TAB 325 MG (65 MG ELEMENTAL FE) 325 MG: 325 (65 FE) TAB at 09:59

## 2024-07-24 RX ADMIN — OXYCODONE HYDROCHLORIDE 10 MG: 5 TABLET ORAL at 04:48

## 2024-07-24 RX ADMIN — ACETAMINOPHEN 1000 MG: 500 TABLET ORAL at 10:00

## 2024-07-24 RX ADMIN — IBUPROFEN 800 MG: 800 TABLET, FILM COATED ORAL at 12:54

## 2024-07-24 RX ADMIN — FERROUS SULFATE TAB 325 MG (65 MG ELEMENTAL FE) 325 MG: 325 (65 FE) TAB at 17:37

## 2024-07-24 RX ADMIN — IBUPROFEN 800 MG: 800 TABLET, FILM COATED ORAL at 21:13

## 2024-07-24 RX ADMIN — ACETAMINOPHEN 1000 MG: 500 TABLET ORAL at 17:37

## 2024-07-24 RX ADMIN — ENOXAPARIN SODIUM 40 MG: 100 INJECTION SUBCUTANEOUS at 10:00

## 2024-07-24 RX ADMIN — IBUPROFEN 800 MG: 800 TABLET, FILM COATED ORAL at 04:34

## 2024-07-24 ASSESSMENT — PAIN DESCRIPTION - ORIENTATION
ORIENTATION: LOWER
ORIENTATION: MID
ORIENTATION: LOWER
ORIENTATION: LOWER;MID

## 2024-07-24 ASSESSMENT — PAIN SCALES - GENERAL
PAINLEVEL_OUTOF10: 4
PAINLEVEL_OUTOF10: 3
PAINLEVEL_OUTOF10: 7
PAINLEVEL_OUTOF10: 0
PAINLEVEL_OUTOF10: 6

## 2024-07-24 ASSESSMENT — PAIN - FUNCTIONAL ASSESSMENT
PAIN_FUNCTIONAL_ASSESSMENT: ACTIVITIES ARE NOT PREVENTED
PAIN_FUNCTIONAL_ASSESSMENT: ACTIVITIES ARE NOT PREVENTED

## 2024-07-24 ASSESSMENT — PAIN DESCRIPTION - DESCRIPTORS
DESCRIPTORS: ACHING
DESCRIPTORS: ACHING;CRAMPING;DISCOMFORT
DESCRIPTORS: DISCOMFORT
DESCRIPTORS: ACHING;CRAMPING;DISCOMFORT

## 2024-07-24 ASSESSMENT — PAIN DESCRIPTION - LOCATION
LOCATION: ABDOMEN
LOCATION: ABDOMEN
LOCATION: ABDOMEN;INCISION
LOCATION: ABDOMEN;INCISION

## 2024-07-24 NOTE — LACTATION NOTE
Language line used.  #[id number]. Infant now able to be in room with mother. Noted mother has breast feed as well as formula feeding. Encouraged mother to breast feed for feedings to keep breast stimulated for milk production. Mother verbalizes understanding. Mother denies difficulty with breast feeding. Encouraged mother to call for any breast feeding assistance. Reminded mother to breast feed at least every 3 hours or more often with feeding cues. Reminded mother to offer both breast with each feeding and to breast at least 10 minutes on each side, mother states she does know this.

## 2024-07-24 NOTE — PROGRESS NOTES
Department of Obstetrics and Gynecology  Labor and Delivery   Post Partum Progress Note      SUBJECTIVE:  Doing well with no complaints. Ambulating throughout room without dizziness or other s/s of anemia. Reports bleeding is decreasing and pain is well controlled with medication. Denies pain or discharge at incisional site. Has voided without difficulty. Has not had BM, + flatus. Eating and drinking well. Denies HA/visual changes/epigastric pain. Breastfeeding is going well. Reports good social support. Denies emotional concerns at this time.     OBJECTIVE:      Vitals:  /78   Pulse 88   Temp 97.8 °F (36.6 °C) (Oral)   Resp 16   LMP 10/23/2023   SpO2 99%   Breastfeeding Unknown   Lab Results   Component Value Date    WBC 12.6 (H) 07/23/2024    HGB 9.7 (L) 07/23/2024    HCT 30.7 (L) 07/23/2024    MCV 87.2 07/23/2024     07/23/2024       CONSTITUTIONAL: generally well-appearing.   ABDOMEN:  Soft, well contracted uterus. Fundus firm at u-1. C/s incision c/d/i. No erythema or discharge noted. BS present x 4 quadrants.   LOCHIA: Normal per pt  LUNGS: CTAB  HEART: RRR,   EXTREMITIES: No calf tenderness, erythema or swelling bilaterally       ASSESSMENT:      PPD # 2  S/p C/s  Breastfeeding well  GBS Positive  RH AB+  Anemia- on ferrous sulfate    PLAN:     Will Continue with PP POC   Encourage PO hydration and early ambulation   Up to shower with Instruction on incision care.   Will plan for discharge on PPD3.      Time Spent 15      LULÚ Bolanos - YUMIKOM

## 2024-07-24 NOTE — PLAN OF CARE
Problem: Pain  Goal: Verbalizes/displays adequate comfort level or baseline comfort level  7/24/2024 1102 by Nida Lomas RN  Outcome: Progressing  7/23/2024 2207 by Diane Lozada RN  Outcome: Progressing  Flowsheets (Taken 7/23/2024 2046)  Verbalizes/displays adequate comfort level or baseline comfort level:   Encourage patient to monitor pain and request assistance   Assess pain using appropriate pain scale   Administer analgesics based on type and severity of pain and evaluate response   Implement non-pharmacological measures as appropriate and evaluate response   Consider cultural and social influences on pain and pain management   Notify Licensed Independent Practitioner if interventions unsuccessful or patient reports new pain     Problem: Infection - Adult  Goal: Absence of infection at discharge  7/24/2024 1102 by Nida Lomas RN  Outcome: Progressing  7/23/2024 2207 by Diane Lozada RN  Outcome: Progressing  Goal: Absence of infection during hospitalization  7/24/2024 1102 by Nida Lomas RN  Outcome: Progressing  7/23/2024 2207 by Diane Lozada RN  Outcome: Progressing  Goal: Absence of fever/infection during anticipated neutropenic period  7/24/2024 1102 by Nida Lomas RN  Outcome: Progressing  7/23/2024 2207 by Diane Lozada RN  Outcome: Progressing     Problem: Safety - Adult  Goal: Free from fall injury  7/24/2024 1102 by Nida Lomas RN  Outcome: Progressing  7/23/2024 2207 by Diane Lozada RN  Outcome: Progressing     Problem: Discharge Planning  Goal: Discharge to home or other facility with appropriate resources  7/24/2024 1102 by Nida Lomas RN  Outcome: Progressing  7/23/2024 2207 by Diane Lozada RN  Outcome: Progressing     Problem: Alteration in the Breast  Goal: Optimize infant feeding at the breast  Description: INTERVENTIONS:  1. Breast and nipple assessment  2. Assess prior breast feeding history  3. Hand expression of breast milk  4.

## 2024-07-24 NOTE — CONSULTS
Session Code:[id number]  American Creole #[id number]   name Jeff ADKNIS discussed plan of care with patient

## 2024-07-24 NOTE — PLAN OF CARE
Problem: Pain  Goal: Verbalizes/displays adequate comfort level or baseline comfort level  7/23/2024 2207 by Diane Lozada RN  Outcome: Progressing  Flowsheets (Taken 7/23/2024 2046)  Verbalizes/displays adequate comfort level or baseline comfort level:   Encourage patient to monitor pain and request assistance   Assess pain using appropriate pain scale   Administer analgesics based on type and severity of pain and evaluate response   Implement non-pharmacological measures as appropriate and evaluate response   Consider cultural and social influences on pain and pain management   Notify Licensed Independent Practitioner if interventions unsuccessful or patient reports new pain  7/23/2024 1324 by Fadumo Salas RN  Outcome: Progressing  7/23/2024 1159 by Fadumo Salas RN  Outcome: Progressing  Flowsheets (Taken 7/23/2024 0808)  Verbalizes/displays adequate comfort level or baseline comfort level:   Encourage patient to monitor pain and request assistance   Administer analgesics based on type and severity of pain and evaluate response   Consider cultural and social influences on pain and pain management   Assess pain using appropriate pain scale   Implement non-pharmacological measures as appropriate and evaluate response     Problem: Infection - Adult  Goal: Absence of infection at discharge  7/23/2024 2207 by Diane Lozada RN  Outcome: Progressing  7/23/2024 1324 by Fadumo Salas RN  Outcome: Progressing  7/23/2024 1159 by Fadumo Salas RN  Outcome: Progressing  Goal: Absence of infection during hospitalization  7/23/2024 2207 by Diane Lozada RN  Outcome: Progressing  7/23/2024 1324 by Fadumo Salas RN  Outcome: Progressing  7/23/2024 1159 by Fadumo Salas, RN  Outcome: Progressing  Goal: Absence of fever/infection during anticipated neutropenic period  7/23/2024 2207 by Diane Lozada RN  Outcome: Progressing  7/23/2024 1324 by Fadumo Salas RN  Outcome: Progressing  7/23/2024 1159 by

## 2024-07-25 VITALS
SYSTOLIC BLOOD PRESSURE: 105 MMHG | OXYGEN SATURATION: 100 % | TEMPERATURE: 97.6 F | DIASTOLIC BLOOD PRESSURE: 73 MMHG | RESPIRATION RATE: 16 BRPM | HEART RATE: 82 BPM

## 2024-07-25 PROCEDURE — APPNB30 APP NON BILLABLE TIME 0-30 MINS

## 2024-07-25 PROCEDURE — 6370000000 HC RX 637 (ALT 250 FOR IP): Performed by: STUDENT IN AN ORGANIZED HEALTH CARE EDUCATION/TRAINING PROGRAM

## 2024-07-25 PROCEDURE — 6370000000 HC RX 637 (ALT 250 FOR IP)

## 2024-07-25 PROCEDURE — 6360000002 HC RX W HCPCS: Performed by: STUDENT IN AN ORGANIZED HEALTH CARE EDUCATION/TRAINING PROGRAM

## 2024-07-25 RX ORDER — PSEUDOEPHEDRINE HCL 30 MG
100 TABLET ORAL 2 TIMES DAILY
Qty: 60 CAPSULE | Refills: 0 | Status: SHIPPED | OUTPATIENT
Start: 2024-07-25

## 2024-07-25 RX ORDER — OXYCODONE HYDROCHLORIDE 5 MG/1
5 TABLET ORAL EVERY 6 HOURS PRN
Qty: 20 TABLET | Refills: 0 | Status: SHIPPED | OUTPATIENT
Start: 2024-07-25 | End: 2024-08-01

## 2024-07-25 RX ORDER — IBUPROFEN 800 MG/1
800 TABLET ORAL EVERY 8 HOURS
Qty: 120 TABLET | Refills: 3 | Status: SHIPPED | OUTPATIENT
Start: 2024-07-25

## 2024-07-25 RX ORDER — FERROUS SULFATE 325(65) MG
325 TABLET ORAL 2 TIMES DAILY WITH MEALS
Qty: 30 TABLET | Refills: 3 | Status: SHIPPED | OUTPATIENT
Start: 2024-07-25

## 2024-07-25 RX ADMIN — FAMOTIDINE 20 MG: 20 TABLET ORAL at 09:39

## 2024-07-25 RX ADMIN — IBUPROFEN 800 MG: 800 TABLET, FILM COATED ORAL at 09:39

## 2024-07-25 RX ADMIN — DOCUSATE SODIUM 100 MG: 100 CAPSULE, LIQUID FILLED ORAL at 09:39

## 2024-07-25 RX ADMIN — FERROUS SULFATE TAB 325 MG (65 MG ELEMENTAL FE) 325 MG: 325 (65 FE) TAB at 09:39

## 2024-07-25 RX ADMIN — OXYCODONE HYDROCHLORIDE 10 MG: 5 TABLET ORAL at 03:13

## 2024-07-25 RX ADMIN — ACETAMINOPHEN 1000 MG: 500 TABLET ORAL at 03:08

## 2024-07-25 RX ADMIN — ENOXAPARIN SODIUM 40 MG: 100 INJECTION SUBCUTANEOUS at 09:38

## 2024-07-25 ASSESSMENT — PAIN DESCRIPTION - DESCRIPTORS: DESCRIPTORS: ACHING;CRAMPING;DISCOMFORT

## 2024-07-25 ASSESSMENT — PAIN SCALES - GENERAL: PAINLEVEL_OUTOF10: 7

## 2024-07-25 ASSESSMENT — PAIN DESCRIPTION - LOCATION: LOCATION: ABDOMEN;INCISION

## 2024-07-25 ASSESSMENT — PAIN - FUNCTIONAL ASSESSMENT: PAIN_FUNCTIONAL_ASSESSMENT: ACTIVITIES ARE NOT PREVENTED

## 2024-07-25 ASSESSMENT — PAIN DESCRIPTION - ORIENTATION: ORIENTATION: LOWER

## 2024-07-25 NOTE — PLAN OF CARE
Problem: Pain  Goal: Verbalizes/displays adequate comfort level or baseline comfort level  7/25/2024 1159 by Vida Saucedo LPN  Outcome: Completed  Flowsheets (Taken 7/25/2024 0824 by Umu Kwan, RN)  Verbalizes/displays adequate comfort level or baseline comfort level:   Encourage patient to monitor pain and request assistance   Assess pain using appropriate pain scale  7/24/2024 2227 by Diane Lozada RN  Outcome: Progressing  7/24/2024 2227 by Diane Lozada RN  Outcome: Progressing  Flowsheets (Taken 7/24/2024 2113)  Verbalizes/displays adequate comfort level or baseline comfort level:   Encourage patient to monitor pain and request assistance   Assess pain using appropriate pain scale   Administer analgesics based on type and severity of pain and evaluate response   Implement non-pharmacological measures as appropriate and evaluate response   Consider cultural and social influences on pain and pain management   Notify Licensed Independent Practitioner if interventions unsuccessful or patient reports new pain     Problem: Infection - Adult  Goal: Absence of infection at discharge  7/25/2024 1159 by Vida Saucedo LPN  Outcome: Completed  7/24/2024 2227 by Diane Lozada, RN  Outcome: Progressing  7/24/2024 2227 by Diane Lozada RN  Outcome: Progressing  Goal: Absence of infection during hospitalization  7/25/2024 1159 by Vida Saucedo LPN  Outcome: Completed  7/24/2024 2227 by Diane Lozada RN  Outcome: Progressing  7/24/2024 2227 by Diane Lozada, RN  Outcome: Progressing  Goal: Absence of fever/infection during anticipated neutropenic period  7/25/2024 1159 by Vida Saucedo LPN  Outcome: Completed  7/24/2024 2227 by Diane Lozada, RN  Outcome: Progressing  7/24/2024 2227 by Diane Lozada RN  Outcome: Progressing     Problem: Safety - Adult  Goal: Free from fall injury  7/25/2024 1159 by Vida Saucedo LPN  Outcome: Completed  7/24/2024 2227 by

## 2024-07-25 NOTE — DISCHARGE INSTRUCTIONS
w.    AKTIVITE   Piti piti ogmante aktivite ou. Rekòmanse rejim egzèsis ou sèlman apre doktè ou oswa fanm ou an.   Evite leve nenpòt bagay ki pi juan pase tibebe w la pandan 6 senmenn oswa jiskaske doktè ou avize w oswa fanm ou.   Evite kondwi pandan 2 senmenn oswa si w ap pran [place].   Tony eskalye may nan yon moman. Sèvi ak prekosyon lè w ap pote tibebe w la leve, li desann mach eskalye yo.   PA GEN aktivite seksyèl e pa gen anyen nan vajen ou (tampon oswa douch) payton 4 a 6 semèn oswa jiskaske doktè ou oswa fanm ou oswa fanm ou.   Prepare w payton diskite mercedes planin familyal nan vizit OB ou a.   Ou ka radha ou fatige oswa ou manke enèji. Nap lè tibebe a nap ratrape mercedes dòmi ou. Ou ka kontinye pran vitamin prenatal payton ranplase livrezon eleman nitritif zhen doktè ou oswa fanm ou an pwan rèsponsablite.   EMOSYON YO       Ou ka radha ou garcia, teritwa, akablan ak atitid. Kontakte founisè OB ou a si sentòm dominic pi grav oswa dire plis pase 1 semèn. Kontakte founisè OB ou si ou gen panse mal tèt ou oswa tibebe ou a.   Si tibebe w la pap sispann kriye epi w radha w akablan, mete tibebe w la nan yon bèso mercedes do yo epi kontakte yon lòt granmoaj payton èd. PA JANM [place] YON TIBEBE.    SENYEN    Règ ou ap diminye nan kantite lajan pandan 2 a 6 semèn kristine cassius yo.   Ou pral remake ke kòm aktivite ou ogmante koule ou ka ogmante. Sa a se fason kò ou nan di ou pran li felicia epi repoze plis.   Si ou satire plis pase yon sèl maksimòm pad nan yon èdtan oswa ou pase yon boul pi gwo pase yon sitwon ak repoze pa piyush koule a ralanti, rele doktè OB ou oswa fanm.    Si senyen ou gen yon odè foul rele doktè ou oswa fanm ou.     SWEN AMANDO    [place]ton [place] amando zahira:   Al maxwell nan tiliv payton amando ou bay nan lopital la si ou gen nenpòt kesyon.   Si ou dominic angaje, manje ka pi difisil oswa douloure pandan 1 - 2 thang. Ou ka twouve li itil payton eksprime kèk lèt anvan ou manje yon fason payton tibebe a ka lanse mercedes pi felicia.   Rand pran vitamin prenatal ou zhen  [place] [id number]   [phone]       Transpò   S.C.A.T:  ofri ivanais otobis nan limit [facility]. ADA ofri ivanais pòt an pòt payton moun ki gen andikap   [phone]       Make yon vwayaj:     [phone]       [facility]: transpò payton granmoun aje yo   [phone]                     Preeklanpsi se tansyon wo ak siy domaj nan ògàn, tankou pwoteyin nan pipi a, anjeneral apre 20 semèn gwosès la. Si li pa trete, preeklanpsi ka fè ou mal oswa tibebe w la.   Preeklanpsi grav ka mennen nan bryan danjere (eklanpsi). Lè preeklanpsi afekte fwa a, li ka lakòz sendwòm HELLP, yon pwoblèm do julio cesar ak senyen. HELLP ka cassius byen vit epi li ka danjere. Se poutèt sa doktè ou tcheke ou menm ak tibebe w la souvan.   Preeklanpsi anjeneral disparèt apre tibebe a fèt. Men, sentòm yo ka dire oswa dominic pi mal apre akouchman an. Nan ka ki ra, sentòm yo ka pa parèt jiska thang oswa menm semèn apre akouchman an.   Ki sentòm yo?   Preeklanpsi twò grav anjeneral pa lakòz sentòm yo. Men, li ka lakòz rapid pran pwa ak anfle [place] nan men yo ak figi. Preeklanpsi grav ka lakòz sentòm tankou yon gwo maltèt, pwoblèm vizyon, ak pwoblèm payton respire. Li ka lakòz lulu doulè nan vant. Epi ou ka pipi mwens pase nòmal.   Angelic ou ka atann apre ou te gen preeklanpsi?   Nan lopital la   Apre yo fin akouche tibebe a ak plasenta a, preeklanpsi anjeneral kòmanse dominic pi byen. Pifò moun dominic pi byen nan premye thang apre akouchman an.   Apre ou fin gen preeklanpsi, ou toujou gen yon risk payton bryan malkadi payton yon thang oswa plis apre [facility]. (Nan ka ki ra anpil, bryan yo rive enoc.) Se konsa, doktè ou ka mande w pran silfat mayezyòm payton yon thang oswa plis payton anpeche bryan. Ou ka pran medikaman lulu payton bese tansyon ou.   Lè ou dorys lakay ou   Tansyon w ap gen plis chans retounen nan nòmal kèk thang apre akchman an. Doktè w la pral vle tcheke tansyon w nenpòt moman nan premye semèn apre w fin kite lopital la.   Tansyon wo pafwa kontinye

## 2024-07-25 NOTE — FLOWSHEET NOTE
ID bands checked. Infant's ID band removed and stapled to footprint sheet, the mother verified as correct, signed and witnessed by RN.     Hugs tag removed. Mother of baby signed Safe Baby Crib Form verifying that she does have a safe crib for baby at home.     Gave mother a car seat.      Discharge instructions given and reviewed. Patient voiced understanding.     Rx's reviewed and Electronically sent to [facility].  Patient voiced understanding.     Patient is ambulating well at discharge with pain #0.     Pt's  is driving patient and baby home.     Mother verbalizes understanding to follow-up with Pediatric Provider [facility] tuesday, and OB Provider in 6 weeks as instructed.     Baby pink, harnessed into carseat at discharge by parents. Parents and baby escorted to hospital exit by nurse.

## 2024-07-25 NOTE — LACTATION NOTE
Language line used.    ID: #[id number]   Name: Wu              Mother states she has been breast feeding but did not breast feed last night because \"they did not bring me the baby last night.\" Encouraged mother to breast feed at least every 3 hours or more often with feeding cues. To offer both breasts with each feeding and to breast feed at least 10 minutes on each side. Mother verbalizes understanding.     Reminded mother that infants lose weight prior to discharge and that we do not want them to lose more than 10%. Infant should then be back to birth weight by the time he is 10-14 days old. Infants then usually gain from 0.5 to 1 ounce per day the first month.     Encouraged mother to call for any breast feeding assistance.

## 2024-07-25 NOTE — PLAN OF CARE
Problem: Pain  Goal: Verbalizes/displays adequate comfort level or baseline comfort level  7/24/2024 2227 by Diane Lozada RN  Outcome: Progressing  7/24/2024 2227 by Diane Lozada RN  Outcome: Progressing  Flowsheets (Taken 7/24/2024 2113)  Verbalizes/displays adequate comfort level or baseline comfort level:   Encourage patient to monitor pain and request assistance   Assess pain using appropriate pain scale   Administer analgesics based on type and severity of pain and evaluate response   Implement non-pharmacological measures as appropriate and evaluate response   Consider cultural and social influences on pain and pain management   Notify Licensed Independent Practitioner if interventions unsuccessful or patient reports new pain  7/24/2024 1102 by Nida Lomas RN  Outcome: Progressing     Problem: Infection - Adult  Goal: Absence of infection at discharge  7/24/2024 2227 by Diane Lozada RN  Outcome: Progressing  7/24/2024 2227 by Diane Lozada RN  Outcome: Progressing  7/24/2024 1102 by Nida Lomas RN  Outcome: Progressing  Goal: Absence of infection during hospitalization  7/24/2024 2227 by Diane Lozada RN  Outcome: Progressing  7/24/2024 2227 by Diane Lozada RN  Outcome: Progressing  7/24/2024 1102 by Nida Lomas RN  Outcome: Progressing  Goal: Absence of fever/infection during anticipated neutropenic period  7/24/2024 2227 by Diane Lozada RN  Outcome: Progressing  7/24/2024 2227 by Diane Lozada RN  Outcome: Progressing  7/24/2024 1102 by Nida Lomas RN  Outcome: Progressing     Problem: Safety - Adult  Goal: Free from fall injury  7/24/2024 2227 by Diane Lozada RN  Outcome: Progressing  7/24/2024 2227 by Diane Lozada RN  Outcome: Progressing  7/24/2024 1102 by Nida Lomas RN  Outcome: Progressing     Problem: Discharge Planning  Goal: Discharge to home or other facility with appropriate resources  7/24/2024 2227 by Diane Lozada  RN  Outcome: Progressing  7/24/2024 2227 by Diane Lozada RN  Outcome: Progressing  7/24/2024 1102 by Nida Lomas RN  Outcome: Progressing     Problem: Alteration in the Breast  Goal: Optimize infant feeding at the breast  Description: INTERVENTIONS:  1. Breast and nipple assessment  2. Assess prior breast feeding history  3. Hand expression of breast milk  4. Mechanical pumping  5. Nipple Shield  6. Supplemental formula feeding (LIP order)  7. Supplemental feeding system/device  8. For cracked, bleeding and or sore nipples reassess latch, treat damaged nipple  7/24/2024 2227 by Diane Lozada RN  Outcome: Progressing  7/24/2024 2227 by Diane Lozada RN  Outcome: Progressing  7/24/2024 1102 by Nida Lomas RN  Outcome: Progressing     Problem: Inadequate Latch, Suck, or Swallow  Goal: Demonstrate ability to latch and sustain latch, audible swallowing and satiety  Description: INTERVENTIONS:  1.  Assess oral anatomy, notify LIP for abnormal findings  2.  Hand expression  3.  Maximize feeding opportunity (skin to skin, behavioral state)  4.  Positioning techniques  5.  Discourage use of pacifier-artificial nipple  6.  Educate mother on feeding cues  7/24/2024 2227 by Diane Lozada RN  Outcome: Progressing  7/24/2024 2227 by Diane Lozada RN  Outcome: Progressing  7/24/2024 1102 by Nida Lomas RN  Outcome: Progressing

## 2024-07-25 NOTE — PLAN OF CARE
Problem: Pain  Goal: Verbalizes/displays adequate comfort level or baseline comfort level  7/24/2024 2227 by Diane Lozada RN  Outcome: Progressing  Flowsheets (Taken 7/24/2024 2113)  Verbalizes/displays adequate comfort level or baseline comfort level:   Encourage patient to monitor pain and request assistance   Assess pain using appropriate pain scale   Administer analgesics based on type and severity of pain and evaluate response   Implement non-pharmacological measures as appropriate and evaluate response   Consider cultural and social influences on pain and pain management   Notify Licensed Independent Practitioner if interventions unsuccessful or patient reports new pain  7/24/2024 1102 by Nida Lomas RN  Outcome: Progressing     Problem: Infection - Adult  Goal: Absence of infection at discharge  7/24/2024 2227 by Diane Lozada RN  Outcome: Progressing  7/24/2024 1102 by Nida Lomas RN  Outcome: Progressing  Goal: Absence of infection during hospitalization  7/24/2024 2227 by Diane Lozada RN  Outcome: Progressing  7/24/2024 1102 by Nida Lomas RN  Outcome: Progressing  Goal: Absence of fever/infection during anticipated neutropenic period  7/24/2024 2227 by Diane Lozada RN  Outcome: Progressing  7/24/2024 1102 by Nida Lomas RN  Outcome: Progressing     Problem: Safety - Adult  Goal: Free from fall injury  7/24/2024 2227 by Diane Lozada RN  Outcome: Progressing  7/24/2024 1102 by Nida Lomas RN  Outcome: Progressing     Problem: Discharge Planning  Goal: Discharge to home or other facility with appropriate resources  7/24/2024 2227 by Diane Lozada RN  Outcome: Progressing  7/24/2024 1102 by Nida Lomas RN  Outcome: Progressing     Problem: Alteration in the Breast  Goal: Optimize infant feeding at the breast  Description: INTERVENTIONS:  1. Breast and nipple assessment  2. Assess prior breast feeding history  3. Hand expression of breast milk  4.

## 2024-07-25 NOTE — DISCHARGE SUMMARY
Obstetrical Discharge Form    Gestational Age:  39w0d    Antepartum complications: +GBS, AMA, late prenatal care    Date of Delivery:   2024      Type of Delivery:    for prior     Delivered By:    Dr. Higgins              Baby:       Information for the patient's :  Bela Pepe [[id number]]      Anesthesia:    Spinal    Intrapartum complications: None    Feeding method:   breast    Postpartum complications: anemia    Discharge Date:   2024    Condition of discharge:  good    Plan:   Follow up    in 1 week(s)

## 2024-07-25 NOTE — CARE COORDINATION
Consult for srinivasa and odette. Spoke with pt and family in room via .     ID: #[id number]   Name: Wu                Family lives at home, 1 other child. They have all other needs met at home. They use taxi's or call a friend for transportation. Pt has medicaid, but states she doesn't know if its activated yet. LSW sent message to /[employer] to check. Resources placed for [facility] and [place] [facility] on AVS. Pt has transportation home with a friend later today. No other needs. Spoke with RN/Umu.

## 2024-08-13 ENCOUNTER — OFFICE VISIT (OUTPATIENT)
Dept: OBGYN | Age: 34
End: 2024-08-13

## 2024-08-13 VITALS
WEIGHT: 140 LBS | BODY MASS INDEX: 23.32 KG/M2 | SYSTOLIC BLOOD PRESSURE: 102 MMHG | DIASTOLIC BLOOD PRESSURE: 67 MMHG | HEIGHT: 65 IN

## 2024-08-13 DIAGNOSIS — Z98.891 STATUS POST REPEAT LOW TRANSVERSE CESAREAN SECTION: ICD-10-CM

## 2024-08-13 DIAGNOSIS — Z09 POSTOP CHECK: Primary | ICD-10-CM

## 2024-08-13 PROBLEM — O99.013 ANEMIA AFFECTING PREGNANCY IN THIRD TRIMESTER: Status: RESOLVED | Noted: 2024-07-22 | Resolved: 2024-08-13

## 2024-08-13 PROBLEM — O09.33 LATE PRENATAL CARE AFFECTING PREGNANCY IN THIRD TRIMESTER: Status: RESOLVED | Noted: 2024-07-22 | Resolved: 2024-08-13

## 2024-08-13 PROBLEM — O99.820 GROUP B STREPTOCOCCAL CARRIAGE COMPLICATING PREGNANCY: Status: RESOLVED | Noted: 2024-07-22 | Resolved: 2024-08-13

## 2024-08-13 PROCEDURE — 99024 POSTOP FOLLOW-UP VISIT: CPT | Performed by: STUDENT IN AN ORGANIZED HEALTH CARE EDUCATION/TRAINING PROGRAM

## 2024-08-13 SDOH — ECONOMIC STABILITY: FOOD INSECURITY: WITHIN THE PAST 12 MONTHS, THE FOOD YOU BOUGHT JUST DIDN'T LAST AND YOU DIDN'T HAVE MONEY TO GET MORE.: NEVER TRUE

## 2024-08-13 SDOH — ECONOMIC STABILITY: FOOD INSECURITY: WITHIN THE PAST 12 MONTHS, YOU WORRIED THAT YOUR FOOD WOULD RUN OUT BEFORE YOU GOT MONEY TO BUY MORE.: NEVER TRUE

## 2024-08-13 SDOH — ECONOMIC STABILITY: INCOME INSECURITY: HOW HARD IS IT FOR YOU TO PAY FOR THE VERY BASICS LIKE FOOD, HOUSING, MEDICAL CARE, AND HEATING?: NOT HARD AT ALL

## 2024-08-13 ASSESSMENT — PATIENT HEALTH QUESTIONNAIRE - PHQ9
SUM OF ALL RESPONSES TO PHQ QUESTIONS 1-9: 0
SUM OF ALL RESPONSES TO PHQ QUESTIONS 1-9: 0
2. FEELING DOWN, DEPRESSED OR HOPELESS: NOT AT ALL
1. LITTLE INTEREST OR PLEASURE IN DOING THINGS: NOT AT ALL
SUM OF ALL RESPONSES TO PHQ QUESTIONS 1-9: 0
SUM OF ALL RESPONSES TO PHQ9 QUESTIONS 1 & 2: 0
SUM OF ALL RESPONSES TO PHQ QUESTIONS 1-9: 0

## 2024-08-13 NOTE — PROGRESS NOTES
Department of Obstetrics and Gynecology  Postpartum Office Visit  Name:  Sabrina Pepe   CSN: [id number]    : 1990   Age: 33 y.o.        EDC: Estimated Date of Delivery: 24     Sabrina Pepe is a 33 y.o.  that presents at 3 weeks for routine postop exam following RLTCS without complications.    SUBJECTIVE: Pt doing well today without complaints, does report some clear oozing from incision. Pt denies headache, vision changes, right upper quadrant pain, edema. Lochia light. Breast and bottle feeding. Denies depression, anxiety. Sleeping appropriately; +support system at home. Pregnancy complications include Previous CS, GBS positive, late prenatal care, anemia.     GYN HX:    Cervical cancer screening:  Last pap smear 24, negative.  No history of abnormal pap smears.    Patient's medications, allergies, past medical, surgical, social and family histories were reviewed and updated as appropriate.    ROS:  Constitutional: negative for fever, or chills  Respiratory: negative for cough, wheezing, SOB  Cardiovascular: negative for chest pain, palpitations, chest pressure/discomfort  Gastrointestinal: negative for nausea, vomiting, constipation, diarrhea, abdominal pain, change in bowel habits  Genitourinary: negative for dysuria, hematuria, trouble voiding, or incontinence; negative for vaginal discharge, itching, odor, or lesions  Breast: negative for breast lump, breast tenderness, nipple discharge, rash and skin color change  Musculoskeletal: negative for joint swelling or pain  Neurological: negative for headaches or dizziness  Behavioral/Psych: negative for depression/anxiety    OBJECTIVE:   /67 (Site: Left Upper Arm, Position: Sitting, Cuff Size: Large Adult)   Ht 1.651 m (5' 5\")   Wt 63.5 kg (140 lb)   LMP 10/23/2023   Breastfeeding Yes   BMI 23.30 kg/m²     Constitutional: healthy appearing, well nourished, well developed.  Psychiatric: oriented to time/place/person,

## 2024-09-03 ENCOUNTER — POSTPARTUM VISIT (OUTPATIENT)
Dept: OBGYN | Age: 34
End: 2024-09-03
Payer: MEDICAID

## 2024-09-03 VITALS — BODY MASS INDEX: 23.8 KG/M2 | SYSTOLIC BLOOD PRESSURE: 107 MMHG | DIASTOLIC BLOOD PRESSURE: 70 MMHG | WEIGHT: 143 LBS

## 2024-09-03 DIAGNOSIS — N94.6 DYSMENORRHEA: ICD-10-CM

## 2024-09-03 ASSESSMENT — ENCOUNTER SYMPTOMS
DIARRHEA: 0
RESPIRATORY NEGATIVE: 1
CONSTIPATION: 0
SHORTNESS OF BREATH: 0
ABDOMINAL PAIN: 0
VOMITING: 0
NAUSEA: 0
CHEST TIGHTNESS: 0
GASTROINTESTINAL NEGATIVE: 1

## 2024-09-03 NOTE — PROGRESS NOTES
Post Partum Progress Note    Subjective:   Patient is a 33 y.o.    female S/P uncomplicated . The pregnancy was complicated by  see prenatal flowsheet . No current complaints are noted including headache, change in vision, fever, chills, chest pain, shortness of breath, nausea, vomiting, diarrhea or constipation. The patient denies any urinary complaints or calf tenderness.      Lochia is described as none.   The patient plans to breastfeed, plans to bottle feed.   Complaints of post partum depression: No.    Last Pap smear: 2024 neg.  She has had intercourse since delivery.   Desires birth control.   Baby has been to the pediatrician.     Review of Systems   Constitutional: Negative.  Negative for chills and fever.   Respiratory: Negative.  Negative for chest tightness and shortness of breath.    Gastrointestinal: Negative.  Negative for abdominal pain, constipation, diarrhea, nausea and vomiting.   Genitourinary: Negative.  Negative for dyspareunia, dysuria, flank pain, frequency, genital sores, menstrual problem, pelvic pain, urgency, vaginal bleeding and vaginal discharge.   Neurological:  Negative for dizziness, seizures, weakness and headaches.   Psychiatric/Behavioral: Negative.          Objective:   Physical Exam  Vitals and nursing note reviewed.   Constitutional:       Appearance: Normal appearance.   HENT:      Head: Normocephalic.   Pulmonary:      Effort: Pulmonary effort is normal.   Abdominal:      Comments: LT incision c/d/i   Musculoskeletal:         General: Normal range of motion.      Cervical back: Normal range of motion.   Skin:     General: Skin is warm and dry.   Neurological:      Mental Status: She is alert.   Psychiatric:         Mood and Affect: Mood normal.        Impression:  S/P     Assessment / Plan:   Diagnosis Orders   1. Postpartum care following  delivery        2. Dysmenorrhea           Patient desires long lasting birth control, dicussed IUD

## (undated) DEVICE — GOWN,ECLIPSE,POLYRNF,BRTHSLV,L,30/CS: Brand: MEDLINE

## (undated) DEVICE — MATTRESS MAXI AIR TRNSF SGL PT USE DCS 37 45 48 52 75

## (undated) DEVICE — SUTURE VICRYL ABSRB BRAID COAT UD CTX 3-0 36IN  J980H

## (undated) DEVICE — WOUND RETRACTOR AND PROTECTOR: Brand: ALEXIS WOUND PROTECTOR-RETRACTOR

## (undated) DEVICE — APPLICATOR MEDICATED 26 CC SOLUTION HI LT ORNG CHLORAPREP

## (undated) DEVICE — STRIP SKIN CLSR W1XL5IN NYLON REINF CURAD STERIL

## (undated) DEVICE — 3M™ STERI-STRIP™ COMPOUND BENZOIN TINCTURE 40 BAGS/CARTON 4 CARTONS/CASE C1544: Brand: 3M™ STERI-STRIP™

## (undated) DEVICE — ELECTRODE ES AD CRDLSS PT RET REM POLYHESIVE

## (undated) DEVICE — CLEANER,CAUTERY TIP,2X2",STERILE: Brand: MEDLINE

## (undated) DEVICE — DRAPE SHEET ULTRAGARD: Brand: MEDLINE

## (undated) DEVICE — 4-PORT MANIFOLD: Brand: NEPTUNE 2

## (undated) DEVICE — PACK PROCEDURE SURG C SECT

## (undated) DEVICE — DRESSING TRNSPAR W8XL12IN FLM SURESITE 123

## (undated) DEVICE — SUTURE VICRYL SZ 1 L36IN ABSRB VLT L40MM CT 1/2 CIR J359H

## (undated) DEVICE — STRIP,CLOSURE,WOUND,MEDI-STRIP,1/2X4: Brand: MEDLINE

## (undated) DEVICE — SPONGE GZ W4XL8IN COT WVN 12 PLY

## (undated) DEVICE — BABY BLANKET KIT: Brand: MEDLINE INDUSTRIES, INC.

## (undated) DEVICE — TOWEL,OR,DSP,ST,BLUE,STD,6/PK,12PK/CS: Brand: MEDLINE

## (undated) DEVICE — GARMENT,MEDLINE,DVT,INT,CALF,MED, GEN2: Brand: MEDLINE

## (undated) DEVICE — TOTAL TRAY, DB, 100% SILI FOLEY, 16FR 10: Brand: MEDLINE

## (undated) DEVICE — BLOOD TRANSFER DEVICE: Brand: MEDLINE

## (undated) DEVICE — SUTURE MONOCRYL SZ 3-0 L27IN ABSRB UD L60MM KS STR REV CUT Y523H